# Patient Record
Sex: MALE | URBAN - METROPOLITAN AREA
[De-identification: names, ages, dates, MRNs, and addresses within clinical notes are randomized per-mention and may not be internally consistent; named-entity substitution may affect disease eponyms.]

---

## 2020-07-30 ENCOUNTER — OFFICE VISIT (OUTPATIENT)
Dept: URBAN - METROPOLITAN AREA SURGERY CENTER 7 | Facility: SURGERY CENTER | Age: 65
End: 2020-07-30

## 2020-07-31 ENCOUNTER — LAB OUTSIDE AN ENCOUNTER (OUTPATIENT)
Dept: URBAN - METROPOLITAN AREA CLINIC 121 | Facility: CLINIC | Age: 65
End: 2020-07-31

## 2020-07-31 LAB
A/G RATIO: (no result)
ALBUMIN: 4.1
ALK PHOS: 55
ANTI-HAV: (no result)
ANTI-HBC: (no result)
B-12: 1073
BASOPH COUNT: (no result)
BASOPHIL %: 0.8
BG RANDOM: 79
BILI TOTAL: 0.6
BUN/CREAT: (no result)
BUN: 16
CALCIUM: 9.3
CHLORIDE: 107
CO2: 22
CREATININE: 1.46
CRP: 0.84
EOS COUNT: (no result)
EOSINOPHIL %: 2.5
FOLATE: 13.8
GLOBULIN TOT: (no result)
HBSAG: (no result)
HCT: 43.2
HEP C AB: (no result)
HGB: 14
LYMPHS %: 15.6
MCH: 28.3
MCHC: (no result)
MCV: 87.3
MONOCYTE %: 9.9
MONOSCT AUTO: (no result)
PLATELETS: (no result)
PMN %: 71.2
POTASSIUM: 4.2
PROTEIN, TOT: 6.6
RBC: (no result)
RDW: 13.5
SGOT (AST): 26
SGPT (ALT): 18
WBC: (no result)
ZZ-GE-UNK: (no result)
ZZ-GE-UNK: 0.01

## 2020-08-03 ENCOUNTER — OFFICE VISIT (OUTPATIENT)
Dept: URBAN - METROPOLITAN AREA CLINIC 115 | Facility: CLINIC | Age: 65
End: 2020-08-03

## 2022-04-30 ENCOUNTER — TELEPHONE ENCOUNTER (OUTPATIENT)
Dept: URBAN - METROPOLITAN AREA CLINIC 121 | Facility: CLINIC | Age: 67
End: 2022-04-30

## 2022-04-30 RX ORDER — SULFASALAZINE 500 MG/1
2 TABS TID TABLET ORAL
OUTPATIENT
Start: 2011-06-13

## 2022-04-30 RX ORDER — SULFASALAZINE 500 MG/1
TAKE 2 TABLETS BY MOUTH 3 TIMES DAILY TABLET ORAL
OUTPATIENT
Start: 2013-03-07 | End: 2014-02-14

## 2022-04-30 RX ORDER — SULFASALAZINE 500 MG/1
TAKE 2 TABLETS BY MOUTH 3 TIMES DAILY TABLET ORAL
OUTPATIENT
Start: 2013-03-07

## 2022-04-30 RX ORDER — SULFASALAZINE 500 MG/1
2 TABS TID TABLET ORAL
OUTPATIENT
Start: 2011-06-13 | End: 2014-02-14

## 2022-04-30 RX ORDER — SULFASALAZINE 500 MG/1
TAKE 2 TAB PO TID TABLET ORAL
OUTPATIENT
Start: 2012-06-18 | End: 2014-02-14

## 2022-04-30 RX ORDER — MESALAMINE 400 MG/1
2 TABS TID CAPSULE, DELAYED RELEASE ORAL
OUTPATIENT
Start: 2007-04-30

## 2022-04-30 RX ORDER — MESALAMINE 400 MG/1
2 TAB TID CAPSULE, DELAYED RELEASE ORAL
OUTPATIENT
Start: 2007-12-03 | End: 2007-12-03

## 2022-04-30 RX ORDER — SULFASALAZINE 500 MG/1
TAKE 2  TABLETS THREE TIMES A DAY TABLET, DELAYED RELEASE ORAL
OUTPATIENT
Start: 2012-03-16

## 2022-04-30 RX ORDER — SULFASALAZINE 500 MG/1
TAKE 2 TAB PO TID TABLET ORAL
OUTPATIENT
Start: 2012-06-18

## 2022-04-30 RX ORDER — SULFASALAZINE 500 MG/1
TAKE 2  TABLETS THREE TIMES A DAY TABLET, DELAYED RELEASE ORAL
OUTPATIENT
Start: 2012-03-16 | End: 2014-02-14

## 2022-04-30 RX ORDER — MESALAMINE 400 MG/1
2 TABS PO TID CAPSULE, DELAYED RELEASE ORAL
OUTPATIENT
Start: 2009-03-26 | End: 2013-03-20

## 2022-04-30 RX ORDER — MESALAMINE 400 MG/1
2 TABS PO TID CAPSULE, DELAYED RELEASE ORAL
OUTPATIENT
Start: 2009-03-26

## 2022-04-30 RX ORDER — SULFASALAZINE 500 MG/1
TAKE 2 TABLETS BY MOUTH 3 TIMES DAILY TABLET ORAL
OUTPATIENT
Start: 2013-03-21

## 2022-04-30 RX ORDER — MESALAMINE 400 MG/1
2 TAB TID CAPSULE, DELAYED RELEASE ORAL
OUTPATIENT
Start: 2006-03-13

## 2022-04-30 RX ORDER — SULFASALAZINE 500 MG/1
TAKE 2 TABLETS BY MOUTH THREE TIMES DAILY TABLET ORAL
OUTPATIENT
Start: 2018-12-26

## 2022-04-30 RX ORDER — CHOLECALCIFEROL (VITAMIN D3) 25 MCG
TABLET,CHEWABLE ORAL
OUTPATIENT
Start: 2013-03-20

## 2022-05-01 ENCOUNTER — TELEPHONE ENCOUNTER (OUTPATIENT)
Dept: URBAN - METROPOLITAN AREA CLINIC 121 | Facility: CLINIC | Age: 67
End: 2022-05-01

## 2022-05-01 RX ORDER — SULFASALAZINE 500 MG/1
TAKE 2 TABLETS BY MOUTH FOUR TIMES DAILY TABLET ORAL
Status: ACTIVE | COMMUNITY
Start: 2018-12-26

## 2022-05-01 RX ORDER — UREA 10 %
LOTION (ML) TOPICAL
Status: ACTIVE | COMMUNITY
Start: 2015-09-17

## 2022-05-01 RX ORDER — CHOLECALCIFEROL (VITAMIN D3) 25 MCG
QD TABLET,CHEWABLE ORAL
Status: ACTIVE | COMMUNITY
Start: 2014-02-14

## 2022-05-01 RX ORDER — MULTIVIT-MIN/FA/LYCOPEN/LUTEIN .4-300-25
TABLET ORAL
Status: ACTIVE | COMMUNITY
Start: 2015-09-17

## 2022-05-01 RX ORDER — TAMSULOSIN HCL 0.4 MG
QD CAPSULE ORAL
Status: ACTIVE | COMMUNITY
Start: 2014-02-14

## 2022-06-08 ENCOUNTER — TELEPHONE ENCOUNTER (OUTPATIENT)
Dept: URBAN - METROPOLITAN AREA CLINIC 27 | Facility: CLINIC | Age: 67
End: 2022-06-08

## 2022-06-08 RX ORDER — SULFASALAZINE 500 MG/1
2 TABLETS TABLET ORAL
Qty: 240 TABLET | Refills: 3
Start: 2018-12-26 | End: 2022-10-06

## 2022-11-15 ENCOUNTER — TELEPHONE ENCOUNTER (OUTPATIENT)
Dept: URBAN - METROPOLITAN AREA CLINIC 27 | Facility: CLINIC | Age: 67
End: 2022-11-15

## 2022-11-15 RX ORDER — SULFASALAZINE 500 MG/1
2 TABLETS TABLET ORAL
Qty: 720 TABLET | Refills: 3
Start: 2018-12-26 | End: 2023-11-10

## 2024-04-24 ENCOUNTER — OV NP (OUTPATIENT)
Dept: URBAN - METROPOLITAN AREA CLINIC 27 | Facility: CLINIC | Age: 69
End: 2024-04-24
Payer: COMMERCIAL

## 2024-04-24 VITALS
HEART RATE: 81 BPM | WEIGHT: 224 LBS | HEIGHT: 71 IN | BODY MASS INDEX: 31.36 KG/M2 | DIASTOLIC BLOOD PRESSURE: 106 MMHG | SYSTOLIC BLOOD PRESSURE: 175 MMHG

## 2024-04-24 DIAGNOSIS — K50.90 CROHN'S DISEASE, UNSPECIFIED, WITHOUT COMPLICATIONS: ICD-10-CM

## 2024-04-24 DIAGNOSIS — Z85.46 PERSONAL HISTORY OF MALIGNANT NEOPLASM OF PROSTATE: ICD-10-CM

## 2024-04-24 DIAGNOSIS — K60.3 ANAL FISTULA: ICD-10-CM

## 2024-04-24 PROCEDURE — 99214 OFFICE O/P EST MOD 30 MIN: CPT | Performed by: INTERNAL MEDICINE

## 2024-04-24 RX ORDER — CHOLECALCIFEROL (VITAMIN D3) 25 MCG
QD TABLET,CHEWABLE ORAL
Status: ACTIVE | COMMUNITY
Start: 2014-02-14

## 2024-04-24 RX ORDER — UREA 10 %
LOTION (ML) TOPICAL
Status: ACTIVE | COMMUNITY
Start: 2015-09-17

## 2024-04-24 RX ORDER — MULTIVIT-MIN/FA/LYCOPEN/LUTEIN .4-300-25
TABLET ORAL
Status: ACTIVE | COMMUNITY
Start: 2015-09-17

## 2024-04-24 RX ORDER — TAMSULOSIN HCL 0.4 MG
QD CAPSULE ORAL
Status: ACTIVE | COMMUNITY
Start: 2014-02-14

## 2024-04-24 RX ORDER — SULFASALAZINE 500 MG/1
2 TABLETS TABLET, DELAYED RELEASE ORAL
Qty: 720 TABLET | Refills: 0 | OUTPATIENT
Start: 2024-04-26 | End: 2024-07-25

## 2024-04-24 NOTE — HPI-TODAY'S VISIT:
Mr. Robledo is a 68-year-old established patient with Crohn's disease who was last seen in 2020.  His PCP is Dr. Jono Woodson. He was dx'd with Crohn's disease - 1971.  Initially told he had UC, but ultimately dx'd with Crohn's. He takes  Sulfasalazine - 2 bid. He is prescribed  2 qid. He usually forgets to take the 4th dose. He's taken Prednisone in the past. However, none in the past 15 - 20 year. Never treated with a biologics in the past. He wants to change to a different medication because he believes his intermittent elevated BP is due to Sulfasalazine.   On ROS, he reports increased stool urgency and has to wear Depends adult diapers due to occasional fecal incontinence. He has an average 3-5 BMs per day with a loose consistency. Overall, no nocturnal BMs. He's seen drops of red blood on the TP but no gross hematochezia or melena. He denies weight loss. He is not up-to-date on eye exam.  Last labs 10 months ago.   Colonoscopy 2020 - first degree hemorrhoids. Old scarring, edema, mild erythema, punctate ulcers. He was advised to have a repeat colonoscopy in 2021. He was prescribed Sulfasalazine 2 qid with.   History of prostate cancer. S/P seed implants.

## 2024-06-10 ENCOUNTER — OFFICE VISIT (OUTPATIENT)
Dept: URBAN - METROPOLITAN AREA SURGERY CENTER 7 | Facility: SURGERY CENTER | Age: 69
End: 2024-06-10